# Patient Record
Sex: MALE | Race: WHITE | HISPANIC OR LATINO
[De-identification: names, ages, dates, MRNs, and addresses within clinical notes are randomized per-mention and may not be internally consistent; named-entity substitution may affect disease eponyms.]

---

## 2019-11-06 PROBLEM — Z00.00 ENCOUNTER FOR PREVENTIVE HEALTH EXAMINATION: Status: ACTIVE | Noted: 2019-11-06

## 2019-12-18 ENCOUNTER — APPOINTMENT (OUTPATIENT)
Dept: GASTROENTEROLOGY | Facility: CLINIC | Age: 24
End: 2019-12-18

## 2022-10-25 ENCOUNTER — APPOINTMENT (OUTPATIENT)
Dept: HUMAN REPRODUCTION | Facility: CLINIC | Age: 27
End: 2022-10-25

## 2022-10-25 PROCEDURE — 89322 SEMEN ANAL STRICT CRITERIA: CPT

## 2022-12-14 ENCOUNTER — APPOINTMENT (OUTPATIENT)
Dept: GASTROENTEROLOGY | Facility: CLINIC | Age: 27
End: 2022-12-14

## 2022-12-14 VITALS
HEART RATE: 77 BPM | OXYGEN SATURATION: 97 % | TEMPERATURE: 97.3 F | BODY MASS INDEX: 29.68 KG/M2 | HEIGHT: 71 IN | DIASTOLIC BLOOD PRESSURE: 66 MMHG | WEIGHT: 212 LBS | SYSTOLIC BLOOD PRESSURE: 122 MMHG

## 2022-12-14 DIAGNOSIS — Z78.9 OTHER SPECIFIED HEALTH STATUS: ICD-10-CM

## 2022-12-14 DIAGNOSIS — Z80.9 FAMILY HISTORY OF MALIGNANT NEOPLASM, UNSPECIFIED: ICD-10-CM

## 2022-12-14 DIAGNOSIS — R10.32 RIGHT LOWER QUADRANT PAIN: ICD-10-CM

## 2022-12-14 DIAGNOSIS — R10.31 RIGHT LOWER QUADRANT PAIN: ICD-10-CM

## 2022-12-14 PROCEDURE — 99203 OFFICE O/P NEW LOW 30 MIN: CPT

## 2022-12-14 RX ORDER — SIMETHICONE 125 MG/1
125 TABLET, CHEWABLE ORAL
Qty: 120 | Refills: 3 | Status: ACTIVE | COMMUNITY
Start: 2022-12-14 | End: 1900-01-01

## 2022-12-14 RX ORDER — POLYETHYLENE GLYCOL 3350 17 G/17G
17 POWDER, FOR SOLUTION ORAL DAILY
Qty: 30 | Refills: 3 | Status: ACTIVE | COMMUNITY
Start: 2022-12-14 | End: 1900-01-01

## 2022-12-14 NOTE — REVIEW OF SYSTEMS
[Feeling Tired] : feeling tired [Chest Pain] : chest pain [Palpitations] : palpitations [Abdominal Pain] : abdominal pain [Constipation] : constipation [Bloating (gassiness)] : bloating [Arthralgias (joint pain)] : arthralgias [Joint Stiffness] : joint stiffness [Anxiety] : anxiety [Negative] : Heme/Lymph [FreeTextEntry8] : polyuria, mocturia, dysuria, microscopic hematuria

## 2022-12-14 NOTE — HISTORY OF PRESENT ILLNESS
[FreeTextEntry1] : The patient is a 27-year-old  male with no significant past medical history who was referred to my office by Dr. Alicia Eason for abdominal pain and dyspepsia. The patient also admits to having occasional constipation, change in bowel habits and change in caliber of stool. I was asked to render an opinion for consultation for the above complaints.   The patient states that he is feeling uncomfortable x 2 months. The patient was evaluated by Dr. Gonzales of urology for dysuria, microscopic hematuria, polyuria, nocturia. According to the patient, the urine culture was negative. The patient was treated with Tamsulosin with little improvement.  The patient complains of abdominal pain.  The patient describes the abdominal pain as a crampy, intermittent lower abdominal discomfort that is nonradiating in nature.  The abdominal pain is unrelated to meals or passing gas or having bowel movements.  The abdominal pain improves and is worse with meals and improves with passing gas or having a bowel movement.  The abdominal pain is described as mild in nature.  The abdominal pain occurs in the morning.  The abdominal pain can occur at any time.   The abdominal pain never has awakened the patient from sleep.  The abdominal pain is not relieved with any medications.  The abdominal pain is associated with abdominal gas and bloating.  The patient denies any nausea or vomiting.  The patient denies any gastroesophageal reflux disease or dysphagia. The patient complains of atypical chest pain and palpitations but denies any shortness of breath.  The chest pain is described as a pressure, intermittent substernal discomfort that is nonradiating in nature.  The patient denies any diaphoresis. The chest pain is described as being 5 out of 10 in intensity.  The chest pain can occur at any time.  The chest pain is worse at night and early morning.  The chest pain is worse with stress.  The chest pain is unrelated to meals and passing gas.  The chest pain never awakened the patient from sleep.  The patient complains of occasional constipation but denies any diarrhea.  The patient has 1 bowel movement a day. The patient complains of a change in bowel habits.  The patient complains of a change in caliber of stool.   The patient denies having mucus discharge with the bowel movements.  The patient denies any bright red blood per rectum, melena or hematemesis.  The patient complains of rectal discomfort and rectal pruritus. The patient complains of fluctuating weight but denies any anorexia.  He denies any fevers or chills.  The patient denies any jaundice or pruritus.  The patient complains of occasional left sided back pain.  The patient denies ever having a prior upper endoscopy and colonoscopy performed by another gastroenterologist.  The patient admits to a family history of GI problems.  The patient’s father had a history of gastritis.\par \par (-) smoking, (-) ETOH, (-) IVDA\par

## 2022-12-14 NOTE — ASSESSMENT
[FreeTextEntry1] : Dyspepsia: The patient complains of dyspeptic symptoms.  The patient was advised to abide by an anti-gas (low FOD-MAP) diet.  The patient was given a pamphlet for anti-gas (low FOD-MAP).  The patient and I reviewed the anti-gas (low FOD-MAP) diet at length. The patient is to start on a trial of Simethicone one tablet 4 times a day p.r.n. abdominal pain and gas.\par Abdominal Pain: The patient complains of abdominal pain. The patient is to avoid nonsteroidal anti-inflammatory drugs and aspirin.  I recommend a trial of Phazyme 1 tablet PO 3 times a day for 3 months for the symptoms.\par Constipation: The patient complains of constipation. I recommend a high-fiber diet. I recommend a trial of a probiotic such as Align once a day. I recommend a trial of Metamucil once a day for fiber supplementation. I recommend a trial of Miralax 1 packet once a day for the constipation. If the symptoms persist, the patient may require a colonoscopy to assess the symptoms.  The patient was told of the risks and benefits of the procedure.  The patient was told of the risks of perforation, emergency surgery, bleeding, infections and missed lesions.  The patient agreed and will followup to reassess the symptoms.  \par Urinary Tract Symptoms: The patient was evaluated by Dr. Gonzales of urology for dysuria, microscopic hematuria, polyuria, nocturia. According to the patient, the urine culture was negative. The patient was treated with Tamsulosin with little improvement. The patient is scheduled for a CAT scan of the abdomen and pelvis as per urologist to assess the symptoms.  The patient is to followup with the urologist for the symptoms and further workup and treatment.\par Atypical Chest Pain: The patient complains of atypical chest pain of unclear etiology.  The patient was advised to follow up with the PMD and cardiologist regarding evaluation for the atypical chest pain. The patient was told of possible etiologies such as cardiac, pulmonary, GI, musculoskeletal, stress and other causes for the atypical chest pain.  The patient agrees and will follow-up with the PMD and cardiologist. \par Follow-up: The patient is to follow-up in the office in 6 months to reassess the symptoms. The patient was told to call the office if any further problems. \par \par

## 2023-05-05 ENCOUNTER — APPOINTMENT (OUTPATIENT)
Dept: GASTROENTEROLOGY | Facility: CLINIC | Age: 28
End: 2023-05-05
Payer: COMMERCIAL

## 2023-05-05 VITALS
WEIGHT: 256 LBS | SYSTOLIC BLOOD PRESSURE: 103 MMHG | HEIGHT: 71 IN | HEART RATE: 70 BPM | TEMPERATURE: 97.5 F | OXYGEN SATURATION: 98 % | BODY MASS INDEX: 35.84 KG/M2 | DIASTOLIC BLOOD PRESSURE: 66 MMHG

## 2023-05-05 DIAGNOSIS — K21.9 GASTRO-ESOPHAGEAL REFLUX DISEASE W/OUT ESOPHAGITIS: ICD-10-CM

## 2023-05-05 DIAGNOSIS — A04.8 OTHER SPECIFIED BACTERIAL INTESTINAL INFECTIONS: ICD-10-CM

## 2023-05-05 DIAGNOSIS — K59.00 CONSTIPATION, UNSPECIFIED: ICD-10-CM

## 2023-05-05 DIAGNOSIS — R11.0 NAUSEA: ICD-10-CM

## 2023-05-05 DIAGNOSIS — K64.8 OTHER HEMORRHOIDS: ICD-10-CM

## 2023-05-05 DIAGNOSIS — R10.13 EPIGASTRIC PAIN: ICD-10-CM

## 2023-05-05 DIAGNOSIS — N23 UNSPECIFIED RENAL COLIC: ICD-10-CM

## 2023-05-05 DIAGNOSIS — K29.30 CHRONIC SUPERFICIAL GASTRITIS W/OUT BLEEDING: ICD-10-CM

## 2023-05-05 DIAGNOSIS — K57.90 DIVERTICULOSIS OF INTESTINE, PART UNSPECIFIED, W/OUT PERFORATION OR ABSCESS W/OUT BLEEDING: ICD-10-CM

## 2023-05-05 DIAGNOSIS — K63.5 POLYP OF COLON: ICD-10-CM

## 2023-05-05 PROCEDURE — 99214 OFFICE O/P EST MOD 30 MIN: CPT

## 2023-05-05 RX ORDER — DICYCLOMINE HYDROCHLORIDE 10 MG/1
10 CAPSULE ORAL 3 TIMES DAILY
Qty: 90 | Refills: 0 | Status: ACTIVE | COMMUNITY
Start: 2023-05-05 | End: 1900-01-01

## 2023-05-05 RX ORDER — BIFIDOBACTERIUM LONGUM 10MM CELL
4 CAPSULE ORAL
Qty: 30 | Refills: 3 | Status: ACTIVE | COMMUNITY
Start: 2023-05-05 | End: 1900-01-01

## 2023-05-05 RX ORDER — PSYLLIUM SEED
48.57 PACKET (EA) ORAL
Qty: 1 | Refills: 0 | Status: ACTIVE | COMMUNITY
Start: 2023-05-05 | End: 1900-01-01

## 2023-05-05 NOTE — HISTORY OF PRESENT ILLNESS
[FreeTextEntry1] : The has a past medical history significant for asthma and herpes simplex.  The patient is being followed by Dr. Taran Landers. The patient states that he is feeling fine. The patient denies any jaundice or pruritus.  The patient denies any chronic lower back pain. The patient denies any abdominal pain.  The patient complains of abdominal gas and bloating.  The patient complains of nausea but denies any vomiting.  The patient complains of occasional gastroesophageal reflux disease but denies any dysphagia.  The patient denies taking medications for the gastroesophageal reflux disease.  The gastroesophageal reflux disease is worse after meals and late at night and in the early morning. The gastroesophageal reflux disease is improved with proton pump inhibitors, H2 blockers and antacids.   The patient denies any atypical chest pain, shortness of breath or palpitations.  The patient denies any diaphoresis. The patient denies any constipation or diarrhea.  The patient has 1 to 2 bowel movements a day. The patient denies a change in bowel habits.  The patient denies a change in caliber of stool.  The patient denies having mucus discharge with the bowel movements.  The patient denies any bright red blood per rectum, melena or hematemesis.  The patient denies any rectal pain or rectal pruritus.  The patient denies any weight loss or anorexia.  He denies any fevers or chills. The patient had an upper endoscopy and colonoscopy to the terminal ileum performed at the [facility] on [place] 6, 2023. The patient had an upper endoscopy at the [facility] on April 6, 2023. The upper endoscopy was performed up to the level of the second portion of the duodenum. The upper endoscopy revealed mild diffuse gastritis and mild duodenitis. Biopsies were taken of the distal esophagus, antrum, body of stomach and duodenum to assess for esophagitis, gastritis and duodenitis.  The gastric pathology performed on April 6, 2023 revealed esophageal mucosa with unremarkable squamous mucosa (esophagus), gastric antral and body mucosa with moderate to severe chronic active gastritis with lymphoid aggregates that was positive for Helicobacter pylori (antrum and body of stomach) and duodenal mucosa with small intestinal mucosa without significant pathology (duodenum). The colonoscopy to the terminal ileum revealed an ascending colon polyp, a rectal polyp, mild rectal erythema and small internal hemorrhoids.  The ascending colon polyp was snared and removed.  There was no bleeding post polypectomy. The rectal polyp was completely removed with a jumbo biopsy forcep.  Random biopsies were also taken of the rectum to assess for microscopic colitis. There were no other polyps, masses, diverticulosis, AVMs or colitis noted.  The patient tolerated the procedures well.  The patient previously complained of weight loss but denied any anorexia. The patient admitted to losing 20 pounds over 2 years. The patient attributed the weight loss to change in diet. He denies any fevers or chills. The patient denies ever having a prior upper endoscopy and colonoscopy performed by another gastroenterologist. The patient admits to a family history of GI problems. The patient’s maternal aunt had a history of colon cancer at age 50s. \par \par (-) smoking, (-) ETOH, (-) IVDA, (+) marijuana use\par

## 2023-05-05 NOTE — HISTORY OF PRESENT ILLNESS
[FreeTextEntry1] : The has a past medical history significant for asthma and herpes simplex.  The patient is being followed by Dr. Taran Landers. The patient states that he is feeling fine. The patient denies any jaundice or pruritus.  The patient denies any chronic lower back pain. The patient denies any abdominal pain.  The patient complains of abdominal gas and bloating.  The patient complains of nausea but denies any vomiting.  The patient complains of occasional gastroesophageal reflux disease but denies any dysphagia.  The patient denies taking medications for the gastroesophageal reflux disease.  The gastroesophageal reflux disease is worse after meals and late at night and in the early morning. The gastroesophageal reflux disease is improved with proton pump inhibitors, H2 blockers and antacids.   The patient denies any atypical chest pain, shortness of breath or palpitations.  The patient denies any diaphoresis. The patient denies any constipation or diarrhea.  The patient has 1 to 2 bowel movements a day. The patient denies a change in bowel habits.  The patient denies a change in caliber of stool.  The patient denies having mucus discharge with the bowel movements.  The patient denies any bright red blood per rectum, melena or hematemesis.  The patient denies any rectal pain or rectal pruritus.  The patient denies any weight loss or anorexia.  He denies any fevers or chills. The patient had an upper endoscopy and colonoscopy to the terminal ileum performed at the [facility] on [place], [place]23. The patient had an upper endoscopy at the [facility] on April 6, 2023. The upper endoscopy was performed up to the level of the second portion of the duodenum. The upper endoscopy revealed mild diffuse gastritis and mild duodenitis. Biopsies were taken of the distal esophagus, antrum, body of stomach and duodenum to assess for esophagitis, gastritis and duodenitis.  The gastric pathology performed on April 6, 2023 revealed esophageal mucosa with unremarkable squamous mucosa (esophagus), gastric antral and body mucosa with moderate to severe chronic active gastritis with lymphoid aggregates that was positive for Helicobacter pylori (antrum and body of stomach) and duodenal mucosa with small intestinal mucosa without significant pathology (duodenum). The colonoscopy to the terminal ileum revealed an ascending colon polyp, a rectal polyp, mild rectal erythema and small internal hemorrhoids.  The ascending colon polyp was snared and removed.  There was no bleeding post polypectomy. The rectal polyp was completely removed with a jumbo biopsy forcep.  Random biopsies were also taken of the rectum to assess for microscopic colitis. There were no other polyps, masses, diverticulosis, AVMs or colitis noted.  The patient tolerated the procedures well.  The patient previously complained of weight loss but denied any anorexia. The patient admitted to losing 20 pounds over 2 years. The patient attributed the weight loss to change in diet. He denies any fevers or chills. The patient denies ever having a prior upper endoscopy and colonoscopy performed by another gastroenterologist. The patient admits to a family history of GI problems. The patient’s maternal aunt had a history of colon cancer at age 50s. \par \par (-) smoking, (-) ETOH, (-) IVDA, (+) marijuana use\par

## 2023-05-05 NOTE — ASSESSMENT
[FreeTextEntry1] : Dyspepsia: The patient complains of dyspeptic symptoms.  The patient was advised to continue to abide by an anti-gas (low FOD-MAP) diet.  The patient was previously given a pamphlet for anti-gas (low FOD-MAP).  The patient and I reviewed the anti-gas (low FOD-MAP)diet at length again. The patient is to continue on a trial of Simethicone one tablet 4 times a day p.r.n. abdominal pain and gas.\par GERD: The patient was advised to avoid late-night meals and dietary indiscretions.  The patient was advised to avoid fried and fatty foods.  The patient was advised to abide by an anti-GERD diet. The patient was given a pamphlet for anti-GERD.  The patient and I reviewed the anti-GERD diet at length. I recommend a trial of Pantoprazole 40 mg once a day x 3 months for the symptoms.\par Nausea: The patient complains of nausea. If the symptoms of nausea persists, the patient may require a trial of Zofran 4 mg twice a day. \par \par

## 2023-05-05 NOTE — REASON FOR VISIT
[Post-op/Procedure: _________] : a [unfilled] post-op/procedure visit [Family Member] : family member [Follow-up] : a follow-up of an existing diagnosis [Parent] : parent

## 2023-05-07 ENCOUNTER — NON-APPOINTMENT (OUTPATIENT)
Age: 28
End: 2023-05-07

## 2023-05-07 LAB
APPEARANCE: CLEAR
BILIRUBIN URINE: NEGATIVE
BLOOD URINE: NEGATIVE
COLOR: YELLOW
GLUCOSE QUALITATIVE U: NEGATIVE MG/DL
KETONES URINE: NEGATIVE MG/DL
LEUKOCYTE ESTERASE URINE: NEGATIVE
NITRITE URINE: NEGATIVE
PH URINE: 6
PROTEIN URINE: NEGATIVE MG/DL
SPECIFIC GRAVITY URINE: 1.03
UROBILINOGEN URINE: 0.2 MG/DL

## 2023-11-03 ENCOUNTER — APPOINTMENT (OUTPATIENT)
Dept: GASTROENTEROLOGY | Facility: CLINIC | Age: 28
End: 2023-11-03